# Patient Record
Sex: MALE | Race: OTHER
[De-identification: names, ages, dates, MRNs, and addresses within clinical notes are randomized per-mention and may not be internally consistent; named-entity substitution may affect disease eponyms.]

---

## 2018-05-11 ENCOUNTER — HOSPITAL ENCOUNTER (EMERGENCY)
Dept: HOSPITAL 80 - FED | Age: 43
Discharge: HOME | End: 2018-05-11
Payer: COMMERCIAL

## 2018-05-11 VITALS — SYSTOLIC BLOOD PRESSURE: 158 MMHG | DIASTOLIC BLOOD PRESSURE: 104 MMHG

## 2018-05-11 DIAGNOSIS — E11.9: ICD-10-CM

## 2018-05-11 DIAGNOSIS — I10: ICD-10-CM

## 2018-05-11 DIAGNOSIS — E86.9: ICD-10-CM

## 2018-05-11 DIAGNOSIS — J45.909: Primary | ICD-10-CM

## 2018-05-11 NOTE — EDPHY
H & P


Time Seen by Provider: 05/11/18 02:18


HPI/ROS: 





Chief Complaint:  Difficulty breathing





HPI:  43-year-old male with a history of asthma presenting with acute onset of 

difficulty breathing and wheezing this morning.  Patient denies recent illness.

  He does starting new job this morning and states that stress often set some 

off.  Is been very stressful this morning.  No fevers or chills.  No cough.  

EMS noted diffuse wheezing on arrival.  No chest pain.  Mild dry nonproductive 

cough.  Patient states he takes Advair daily.  Also usually needs albuterol on 

a daily basis as well.  He had oxygen saturations in the low 80s on EMS 

arrival.  Patient states he is feeling better now after and EMS neb.  EMS also 

gave him 50 mg of Benadryl IV. 





ROS:  10 point Review of Systems is negative except as noted in the HPI.





PMH:  Asthma, hypertension, diabetes





Social History: No smoking, no alcohol,  no recreational drug use





Family History: non-contributory





Physical Exam:


Gen: Awake, Alert, obese


HEENT:  


     Nose: no rhinorrhea


     Eyes: PERRLA, EOMI


     Mouth: Moist mucosa 


Neck: Supple, no JVD


Chest: nontender, diffuse expiratory wheezing, no focal rales or rhonchi


Heart: S1, S2 normal, no murmur


Abd: Soft, non-tender, no guarding


Back: no CVA tenderness, no midline tenderness 


Ext: no edema, non-tender


Skin: no rash


Neuro: CN II-XII intact, Sensation grossly intact, Strength 5/5 in bilateral 

upper and lower extremities





Constitutional: 


 Initial Vital Signs











Temperature (C)  36.6 C   05/11/18 02:32


 


Heart Rate  95   05/11/18 02:32


 


Respiratory Rate  20   05/11/18 02:32


 


Blood Pressure  170/112 H  05/11/18 02:32


 


O2 Sat (%)  97   05/11/18 02:32








 











O2 Delivery Mode               Nasal Cannula


 


O2 (L/minute)                  2














Allergies/Adverse Reactions: 


 





animal dander Allergy (Verified 05/11/18 02:37)


 








Home Medications: 














 Medication  Instructions  Recorded


 


Advair 250/50 (*)  05/11/18


 


HCTZ (*)  05/11/18


 


Lisinopril  05/11/18


 


Norvasc 10 mg (*)  05/11/18


 


Proair Hfa  05/11/18


 


predniSONE 60 mg PO DAILY #15 tab 05/11/18














Medical Decision Making


ED Course/Re-evaluation: 





Patient is improved after albuterol nebs and prednisone.





Patient is improved.  Satting 97% on room air.  Lungs are clear.  Symptoms 

consistent with asthma exacerbation.  He will continue his Advair and his 

albuterol as needed.  Will start him on a course of prednisone.  Will follow up 

with primary care physician for further evaluation.





- Data Points


Medications Given: 


 








Discontinued Medications





Albuterol (Proventil Neb)  10 ml IH CONT ONE


   Stop: 05/11/18 02:52


   Last Admin: 05/11/18 02:57 Dose:  9 ml


Albuterol/Ipratropium (Duoneb)  3 ml IH EDNOW ONE


   Stop: 05/11/18 02:19


   Last Admin: 05/11/18 02:32 Dose:  3 ml


Sodium Chloride (Ns)  1,000 mls @ 0 mls/hr IV ONCE ONE; Wide Open


   PRN Reason: Protocol


   Stop: 05/11/18 02:52


   Last Admin: 05/11/18 02:53 Dose:  1,000 mls


Prednisone (Prednisone)  60 mg PO EDNOW ONE


   Stop: 05/11/18 02:19


   Last Admin: 05/11/18 02:32 Dose:  60 mg








Departure





- Departure


Disposition: Home, Routine, Self-Care


Clinical Impression: 


 Asthma





Condition: Good


Instructions:  Asthma (ED)


Additional Instructions: 


Continue you're Advair and albuterol as needed.


Complete a course of prednisone.


Follow up with your primary care physician in 2-3 days for further evaluation.


Return to the emergency department for increasing shortness of breath, cough, 

wheeze, fevers, chills, or any other concerns.


Referrals: 


Patient,NotPresent [Unknown] - As per Instructions


Prescriptions: 


predniSONE 60 mg PO DAILY #15 tab